# Patient Record
Sex: MALE | Race: NATIVE HAWAIIAN OR OTHER PACIFIC ISLANDER
[De-identification: names, ages, dates, MRNs, and addresses within clinical notes are randomized per-mention and may not be internally consistent; named-entity substitution may affect disease eponyms.]

---

## 2017-02-07 ENCOUNTER — HOSPITAL ENCOUNTER (OUTPATIENT)
Dept: HOSPITAL 67 - LABW | Age: 72
Discharge: HOME | End: 2017-02-07
Attending: INTERNAL MEDICINE
Payer: COMMERCIAL

## 2017-02-07 DIAGNOSIS — E78.4: ICD-10-CM

## 2017-02-07 DIAGNOSIS — Z12.5: ICD-10-CM

## 2017-02-07 DIAGNOSIS — I10: Primary | ICD-10-CM

## 2017-02-07 DIAGNOSIS — R53.81: ICD-10-CM

## 2017-02-07 DIAGNOSIS — I25.10: ICD-10-CM

## 2017-02-07 DIAGNOSIS — F03.90: ICD-10-CM

## 2017-02-07 LAB
LDLC SERPL-MCNC: 139 MG/DL (ref 0–?)
PLATELET # BLD: 231 K/UL (ref 142–355)
POTASSIUM SERPL-SCNC: 4.3 MMOL/L (ref 3.6–5.2)
SODIUM SERPL-SCNC: 134 MMOL/L (ref 136–145)

## 2017-02-07 PROCEDURE — 80053 COMPREHEN METABOLIC PANEL: CPT

## 2017-02-07 PROCEDURE — 85027 COMPLETE CBC AUTOMATED: CPT

## 2017-02-07 PROCEDURE — 86140 C-REACTIVE PROTEIN: CPT

## 2017-02-07 PROCEDURE — 84443 ASSAY THYROID STIM HORMONE: CPT

## 2017-02-07 PROCEDURE — 80061 LIPID PANEL: CPT

## 2017-02-07 PROCEDURE — 36415 COLL VENOUS BLD VENIPUNCTURE: CPT

## 2017-02-07 PROCEDURE — G0103 PSA SCREENING: HCPCS

## 2017-02-07 PROCEDURE — 82607 VITAMIN B-12: CPT

## 2017-09-28 ENCOUNTER — HOSPITAL ENCOUNTER (EMERGENCY)
Dept: HOSPITAL 67 - ED | Age: 72
Discharge: HOME | End: 2017-09-28
Payer: COMMERCIAL

## 2017-09-28 VITALS — TEMPERATURE: 98.1 F | SYSTOLIC BLOOD PRESSURE: 141 MMHG | DIASTOLIC BLOOD PRESSURE: 75 MMHG

## 2017-09-28 VITALS — BODY MASS INDEX: 27.32 KG/M2 | WEIGHT: 170 LBS | HEIGHT: 66 IN

## 2017-09-28 DIAGNOSIS — R07.89: Primary | ICD-10-CM

## 2017-09-28 DIAGNOSIS — S29.011A: ICD-10-CM

## 2017-09-28 LAB
PLATELET # BLD: 145 K/UL (ref 142–355)
POTASSIUM SERPL-SCNC: 3.8 MMOL/L (ref 3.6–5.2)
SODIUM SERPL-SCNC: 136 MMOL/L (ref 136–145)

## 2017-09-28 PROCEDURE — 82550 ASSAY OF CK (CPK): CPT

## 2017-09-28 PROCEDURE — 85027 COMPLETE CBC AUTOMATED: CPT

## 2017-09-28 PROCEDURE — 36415 COLL VENOUS BLD VENIPUNCTURE: CPT

## 2017-09-28 PROCEDURE — 99284 EMERGENCY DEPT VISIT MOD MDM: CPT

## 2017-09-28 PROCEDURE — 80053 COMPREHEN METABOLIC PANEL: CPT

## 2017-09-28 PROCEDURE — 93005 ELECTROCARDIOGRAM TRACING: CPT

## 2017-09-28 PROCEDURE — 84484 ASSAY OF TROPONIN QUANT: CPT

## 2017-11-02 ENCOUNTER — HOSPITAL ENCOUNTER (OUTPATIENT)
Dept: HOSPITAL 67 - CT | Age: 72
Discharge: HOME | End: 2017-11-02
Attending: ORTHOPAEDIC SURGERY
Payer: COMMERCIAL

## 2017-11-02 DIAGNOSIS — M48.061: Primary | ICD-10-CM

## 2018-01-15 ENCOUNTER — HOSPITAL ENCOUNTER (OUTPATIENT)
Dept: HOSPITAL 67 - AMB | Age: 73
Discharge: HOME | End: 2018-01-15
Payer: COMMERCIAL

## 2018-01-15 ENCOUNTER — HOSPITAL ENCOUNTER (EMERGENCY)
Dept: HOSPITAL 67 - ED | Age: 73
Discharge: HOME | End: 2018-01-15
Payer: COMMERCIAL

## 2018-01-15 VITALS — DIASTOLIC BLOOD PRESSURE: 71 MMHG | TEMPERATURE: 97.7 F | SYSTOLIC BLOOD PRESSURE: 133 MMHG

## 2018-01-15 VITALS — BODY MASS INDEX: 26.06 KG/M2 | HEIGHT: 67 IN | WEIGHT: 166 LBS

## 2018-01-15 DIAGNOSIS — S27.1XXA: ICD-10-CM

## 2018-01-15 DIAGNOSIS — M62.838: ICD-10-CM
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
## 2018-01-15 DIAGNOSIS — Z04.1: Primary | ICD-10-CM
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
## 2018-01-15 DIAGNOSIS — S20.211A: Primary | ICD-10-CM

## 2018-01-15 LAB
APTT BLD: 27.6 SECONDS (ref 24.5–33.6)
PLATELET # BLD: 188 K/UL (ref 142–355)
POTASSIUM SERPL-SCNC: 4 MMOL/L (ref 3.6–5.2)
SODIUM SERPL-SCNC: 137 MMOL/L (ref 136–145)

## 2018-01-15 PROCEDURE — 85027 COMPLETE CBC AUTOMATED: CPT

## 2018-01-15 PROCEDURE — 82550 ASSAY OF CK (CPK): CPT

## 2018-01-15 PROCEDURE — 80053 COMPREHEN METABOLIC PANEL: CPT

## 2018-01-15 PROCEDURE — 84484 ASSAY OF TROPONIN QUANT: CPT

## 2018-01-15 PROCEDURE — 85730 THROMBOPLASTIN TIME PARTIAL: CPT

## 2018-01-15 PROCEDURE — 99283 EMERGENCY DEPT VISIT LOW MDM: CPT

## 2018-01-15 PROCEDURE — 85610 PROTHROMBIN TIME: CPT

## 2018-01-15 PROCEDURE — 93005 ELECTROCARDIOGRAM TRACING: CPT

## 2018-01-15 PROCEDURE — 80164 ASSAY DIPROPYLACETIC ACD TOT: CPT

## 2018-01-15 PROCEDURE — 36415 COLL VENOUS BLD VENIPUNCTURE: CPT

## 2018-03-07 ENCOUNTER — HOSPITAL ENCOUNTER (OUTPATIENT)
Dept: HOSPITAL 24 - RAD | Age: 73
End: 2018-03-07
Attending: INTERNAL MEDICINE
Payer: COMMERCIAL

## 2018-03-07 DIAGNOSIS — R10.11: Primary | ICD-10-CM

## 2018-03-07 DIAGNOSIS — N43.2: ICD-10-CM

## 2018-03-07 DIAGNOSIS — N50.812: ICD-10-CM

## 2018-03-07 DIAGNOSIS — N50.811: ICD-10-CM

## 2018-03-07 PROCEDURE — 78227 HEPATOBIL SYST IMAGE W/DRUG: CPT

## 2018-03-07 PROCEDURE — 76870 US EXAM SCROTUM: CPT

## 2018-03-07 NOTE — NM
HISTORY: Right upper quadrant pain, nausea



Study: Nuclear medicine HIDA scan with ejection fraction



Comparison: None



Technique: Multiple scintigraphic images of the abdomen were obtained after the intravenous administr
ation of 5.6 mCi of technetium labeled Choletec.



Following distention of the gallbladder with radiotracer, 8 oz of PO Ensure Plus was administered. An
 estimated gallbladder ejection fraction was then calculated. 



Findings:



Homogeneous uptake of radiotracer is seen throughout the liver.  The intrahepatic biliary ductal syst
em is observed normally.  The common hepatic and common bile duct appear grossly normal with normal b
iliary-bowel transit.  The gallbladder is observed to fill normally.



After the PO administration of 8 oz Ensure Plus, a gallbladder ejection fraction of 16.1% (normal > 3
5%) was observed. 



IMPRESSION:

 

Reduced gallbladder ejection fraction of 16.1%, suggestive for gallbladder dyskinesia.







Reported By:Electronically Signed by JOYCE MATTHEW MD at 3/7/2018 11:40:36 AM

## 2018-03-07 NOTE — US
HISTORY:  Chronic right testicular swelling.  No pain.



Study:  Scrotal ultrasound:  Multiplanar ultrasonographic examination of the scrotum and its contents
 was performed.



Comparison:  None



Findings:  



Overall examination of the testicles demonstrate them to be of normal size, echogenicity and echotext
ure.  Vascular flow was documented and symmetric bilaterally.  I see no evidence of a varicocele.



Right testicle:  4.3 cm in length by 2.7 by 2.6 cm.  The epididymis is normal in its appearance.



Left testicle:  4.2 cm in length by 2.0 by 2.2 cm.  A small intra testicular cyst is noted measuring 
approximately 6 mm in maximum thickness.



There is a large complex hydrocele on the right appearing to have thick septations.  This measures at
 least 6 x 8 3.5 by 6 cm.  A small hydrocele is noted on the left.



IMPRESSION:

1.  Large complex hydrocele on the right having thickened septations.

2.  Intrinsically both testicles appear normal and show symmetric vascular flow.

3. Small hydrocele on the left.







Reported By:Electronically Signed by KENTRELL BURK MD at 3/7/2018 1:15:36 PM

## 2018-03-26 ENCOUNTER — HOSPITAL ENCOUNTER (EMERGENCY)
Dept: HOSPITAL 24 - ER | Age: 73
LOS: 1 days | Discharge: HOME | End: 2018-03-27
Payer: COMMERCIAL

## 2018-03-26 VITALS — BODY MASS INDEX: 26.6 KG/M2

## 2018-03-26 DIAGNOSIS — R51: ICD-10-CM

## 2018-03-26 DIAGNOSIS — R10.84: Primary | ICD-10-CM

## 2018-03-26 LAB
ALBUMIN SERPL BCP-MCNC: 3.6 G/DL (ref 3.4–5)
ALP SERPL-CCNC: 74 UNITS/L (ref 46–116)
ALT SERPL W P-5'-P-CCNC: 57 UNITS/L (ref 12–78)
AMORPH SED URNS QL MICRO: (no result) /HPF
AMYLASE SERPL-CCNC: 63 UNITS/L (ref 25–115)
AST SERPL W P-5'-P-CCNC: 20 UNITS/L (ref 15–37)
BACTERIA URNS QL MICRO: (no result) /HPF
BASOPHILS # BLD AUTO: 0.1 X10^3/UL (ref 0–0.1)
BASOPHILS NFR BLD AUTO: 0.8 % (ref 0.2–1)
BILIRUB UR QL STRIP.AUTO: NEGATIVE
BUN SERPL-MCNC: 26 MG/DL (ref 7–18)
CALCIUM ALBUM COR SERPL-SCNC: (no result) MG/DL (ref 8.5–10.1)
CALCIUM ALBUM COR SERPL-SCNC: 141 MMOL/L (ref 136–145)
CALCIUM SERPL-MCNC: 8.9 MG/DL (ref 8.5–10.1)
CHLORIDE SERPL-SCNC: 105 MMOL/L (ref 98–107)
CK MB SERPL-MCNC: < 1 NG/ML (ref 0–4)
CK SERPL-CCNC: 55 UNITS/L (ref 39–308)
CKMB %: 1.8 % (ref ?–4)
CLARITY UR: (no result)
CO2 SERPL-SCNC: 24.7 MMOL/L (ref 21–32)
COLOR UR AUTO: YELLOW
CREAT SERPL-MCNC: 1.07 MG/DL (ref 0.7–1.3)
EGFR  BLACK RACES: > 60 (ref 60–?)
EOSINOPHIL # BLD AUTO: 0.1 X10^3/UL (ref 0–0.2)
EOSINOPHIL NFR BLD AUTO: 1.1 % (ref 0.9–2.9)
ERYTHROCYTE [DISTWIDTH] IN BLOOD BY AUTOMATED COUNT: 13 % (ref 11.6–16.5)
GLUCOSE UR QL STRIP.AUTO: NEGATIVE
HCT VFR BLD AUTO: 41.8 % (ref 42–54)
HGB BLD-MCNC: 14.9 G/DL (ref 13.5–18)
KETONES UR QL STRIP.AUTO: NEGATIVE
LEUKOCYTE ESTERASE UR QL STRIP.AUTO: (no result)
LIPASE SERPL-CCNC: 190 UNITS/L (ref 73–393)
LYMPHOCYTES # BLD AUTO: 0.9 X10^3/UL (ref 1.3–2.9)
LYMPHOCYTES NFR BLD AUTO: 11.6 % (ref 21–51)
MCH RBC QN AUTO: 31.7 PG (ref 27–34)
MCHC RBC AUTO-ENTMCNC: 35.6 G/DL (ref 33–35)
MCV RBC AUTO: 89.2 FL (ref 80–100)
MONOCYTES # BLD AUTO: 0.6 X10^3/UL (ref 0.3–0.8)
MONOCYTES NFR BLD AUTO: 7.7 % (ref 0–13)
NEUTROPHILS # BLD AUTO: 5.8 X10^3/UL (ref 2.2–4.8)
NEUTROPHILS NFR BLD AUTO: 78.8 % (ref 42–75)
NITRITE UR QL STRIP.AUTO: NEGATIVE
PH UR STRIP.AUTO: 8 [PH] (ref 5–8)
PLATELET # BLD: 180 X10^3/UL (ref 150–450)
PMV BLD AUTO: 8.3 FL (ref 7.4–11)
PROT SERPL-MCNC: 7 G/DL (ref 6.4–8.2)
PROT UR QL STRIP.AUTO: NEGATIVE
RBC # BLD AUTO: 4.69 X10^6/UL (ref 4.7–6)
RBC # UR STRIP.AUTO: NEGATIVE /UL
RBC #/AREA URNS HPF: (no result) /HPF
SODIUM SERPL-SCNC: 141 MMOL/L (ref 136–145)
SP GR UR STRIP.AUTO: 1.01 (ref 1–1.03)
SQUAMOUS URNS QL MICRO: (no result) /HPF
TROPONIN I SERPL-MCNC: < 0.02 NG/ML (ref 0–1.5)
UROBILINOGEN UR QL STRIP.AUTO: NORMAL
WBC NRBC COR # BLD AUTO: 7.4 X10^3/UL (ref 3.6–10)

## 2018-03-26 PROCEDURE — 84484 ASSAY OF TROPONIN QUANT: CPT

## 2018-03-26 PROCEDURE — 85025 COMPLETE CBC W/AUTO DIFF WBC: CPT

## 2018-03-26 PROCEDURE — 93005 ELECTROCARDIOGRAM TRACING: CPT

## 2018-03-26 PROCEDURE — 36415 COLL VENOUS BLD VENIPUNCTURE: CPT

## 2018-03-26 PROCEDURE — 81001 URINALYSIS AUTO W/SCOPE: CPT

## 2018-03-26 PROCEDURE — 82150 ASSAY OF AMYLASE: CPT

## 2018-03-26 PROCEDURE — 70450 CT HEAD/BRAIN W/O DYE: CPT

## 2018-03-26 PROCEDURE — 99283 EMERGENCY DEPT VISIT LOW MDM: CPT

## 2018-03-26 PROCEDURE — 99284 EMERGENCY DEPT VISIT MOD MDM: CPT

## 2018-03-26 PROCEDURE — 82550 ASSAY OF CK (CPK): CPT

## 2018-03-26 PROCEDURE — 82553 CREATINE MB FRACTION: CPT

## 2018-03-26 PROCEDURE — 80053 COMPREHEN METABOLIC PANEL: CPT

## 2018-03-26 PROCEDURE — 83690 ASSAY OF LIPASE: CPT

## 2018-03-26 PROCEDURE — 74176 CT ABD & PELVIS W/O CONTRAST: CPT

## 2018-03-26 NOTE — CT
HISTORY: Headache.



Study:  CT brain without contrast



Comparison: None.



Technique:

Multiple axial images of the brain were obtained from the skull base to the vertex without administra
tion of IV contrast.  Dose reduction techniques including Automated Exposure Control (AEC) and adjust
ment of mA and kV were utilized.





Findings: 



Age-related cortical atrophy and chronic small vessel ischemic changes. No acute intraparenchymal hem
orrhage or mass can be identified.  No extra-axial fluid collections are seen.  No alteration in the 
attenuation of the brain parenchyma can be identified to suggest acute or subacute ischemic change.  
The ventricular system is symmetric and nondilated.  The extracranial structures are grossly unremark
able.



IMPRESSION: No acute intracranial pathology. If clinically concerned for acute ischemia/infarction, M
RI brain is more sensitive.



Reported By:Electronically Signed by TESS HARMAN MD at 3/26/2018 11:13:01 PM

## 2018-03-26 NOTE — DR.GENAD
HPI





- PCP


Primary Care Physician: MIRLANDE





- HPI Comment


HPI Comment: PATIENT HAVE DEMENTIA AND HAVE DIFFICULTY EXPRESSING HIS SYMTOMS. 

WIFE SAID HE DID NOT FEEL GOOD ALL DAY. GETTING WORSE.





- Complaint/Symptoms


Chief Complaint Doctors Comments: PATIENT ABDOMINAL PAIN, HEADACHE. TOOK MOM. 

NOT HELPING.


Chief Complaint:: PT WIFE STATES" HE STARTED COMPLAINING OF NOT FEELING GOOD 

ALL DAY HE TOOK 2 BIG DOSES OF MOM THIS MORNING"





- Nurses notes reviewed


Nurses Notes Review: Yes





- Source


History Provided: Patient





- Mode of Arrival


Mode of Arrival: Wheelchair





- Timing


Onset of Chief Complaint: 03/26/18


Came on: Suddenly





- Duration


Duration: Constant


Duration: Days





- Severity


Severity: Moderate





PMH





- PMH


Past Medical History: Yes


Past Medical History: Alzheimers, Dementia, MI, PUD


Past Surgical History: Yes


Surgical History: Angioplasty/Stents, Tonsillectomy


Past Surgical History Comment: BACK





- Family History


History of Family Medical Conditions: No


Family Medical History: Coronary Artery Disease





- Social History


Have you used tobacco products in the last 12 months: No


Does any household member use tobacco: No


Alcohol Use: None


Lives With: Family


Lives Where: Home





- infectious screening


In the last 2 months have you had wt loss of >10#?: NO


Have you had fever, night sweats or hemotysis?: No


Have you traveled outside the country in the last 6 months?: No


Isolation: Standard





ROS





- Review of Systems


Constitutional: Weakness, Fatigue.  negative: Chills, Fever


Eyes: negative: Eye Pain, Discharge


ENTM: negative: Ear Pain, Nose Discharge, Nose Congestion, Throat Pain


Respiratoy: Non-Productive Cough, Short of Breath (ONEXERTION).  negative: 

Productive Cough, Wheezing, Hemoptysis


Cardiovascular: negative: Chest Pain


Gastrointestinal/Abdominal: Abdominal Pain, Constipation, Nausea.  negative: 

Diarrhea, Vomiting


Genitourinary: negative: Hematuria


Neurological: Headache, Dizziness


Musculoskeletal: Muscle Pain


Integumentary: No Symptoms Reported


Hematologic/Lymphatic: Easy Bleeding, Easy Bruising


Endocrine: No Symptoms Reported


All Other Systems: Reviewed and Negative





PE





- Vital Signs


Vitals: 


 





Temperature                      97.6 F


Pulse Rate [Left]                58


Pulse Rate                       110


Respiratory Rate                 18


Blood Pressure [Right Arm]       119/64


Blood Pressure                   144/67


O2 Sat by Pulse Oximetry         98











- General


Limitations: Altered Mental Status, Other (DEMENTIA)


General Appearance: Alert





- Head


Head Exam: Normal Inspection





- Eyes


Eye exam: Normal Appearance





- ENT


ENT Exam: Normal  External Ear Exam


External Ear Exam: Normal External Inspection


TM/Canal Exam: Bilateral Normal


Nose Exam: Normal Nose Exam


Mouth Exam: Normal Inspection


Throat Exam: Normal Inspection





- Neck


Neck Exam: Trachea Midline





- Chest


Chest Inspection: Symmetric Chest Wall Rise





- Respiratory


Respiratory Exam: Normal Lung Sounds Bilat


Respiratory Exam: Bilateral Rhonchi, Lower Rhonchi





- Cardiovascular


Cardiovascular Exam: Regular Rate, Normal Rhythm, Normal Heart Sounds





- Abdominal Exam


Abdominal Exam: Normal Bowel Sounds, Soft.  negative: Tenderness





- Extremities


Extremities Exam: Normal Inspection





- Back


Back Exam: Normal Inspection





- Neurologic


Neurological Exam: Alert, Other (DEMENTIA)





- Psychiatric


Psychiatric Exam: Anxious





- Skin


Skin Exam: Normal Color





MDM





- Additional Information


Additional Information Obtained From: Family





- Differential Diagnosis


Differential Diagnosis: ABDOMINAL PAIN, BOWEL ONSTRUCTION, DIVERTICULITIS. CVA, 

SINUSITIS





Course





- Treatment


Treatment: SEE ORDERS.





- Education/Counseling


Education/Counseling: Patient, Family, Education


Educated On: Diagnosis, Needs for Follow Up





ROR





- Labs Reviewed


Laboratory Results Reviewed?: Yes


Result Diagrams: 


 03/26/18 22:44





 03/26/18 22:44


Laboratory: 


 











WBC  7.4 X10^3/uL (3.6-10.0)   03/26/18  22:44    


 


RBC  4.69 X10^6/uL (4.7-6.0)  L  03/26/18  22:44    


 


Hgb  14.9 g/dL (13.5-18.0)   03/26/18  22:44    


 


Hct  41.8 % (42.0-54.0)  L  03/26/18  22:44    


 


MCV  89.2 fL (80.0-100.0)   03/26/18  22:44    


 


MCH  31.7 pg (27.0-34.0)   03/26/18  22:44    


 


MCHC  35.6 g/dL (33.0-35.0)  H  03/26/18  22:44    


 


RDW  13.0 % (11.6-16.5)   03/26/18  22:44    


 


Plt Count  180 X10^3/uL (150.0-450.0)   03/26/18  22:44    


 


MPV  8.3 fL (7.4-11.0)   03/26/18  22:44    


 


Neut % (Auto)  78.8 % (42.0-75.0)  H  03/26/18  22:44    


 


Lymph % (Auto)  11.6 % (21.0-51.0)  L  03/26/18  22:44    


 


Mono % (Auto)  7.7 % (0.0-13.0)   03/26/18  22:44    


 


Eos % (Auto)  1.1 % (0.9-2.9)   03/26/18  22:44    


 


Baso % (Auto)  0.8 % (0.2-1.0)   03/26/18  22:44    


 


Neut # (Auto)  5.8 x10^3/uL (2.2-4.8)  H  03/26/18  22:44    


 


Lymph # (Auto)  0.9 X10^3/uL (1.3-2.9)  L  03/26/18  22:44    


 


Mono # (Auto)  0.6 x10^3/uL (0.3-0.8)   03/26/18  22:44    


 


Eos # (Auto)  0.1 x10^3/uL (0.0-0.2)   03/26/18  22:44    


 


Baso # (Auto)  0.1 X10^3/uL (0.0-0.1)   03/26/18  22:44    


 


Absolute Nucleated RBC  0.0 /100WBC  03/26/18  22:44    


 


Sodium  141 mmol/L (136-145)   03/26/18  22:44    


 


Corrected Sodium  141 mmol/L (136-145)   03/26/18  22:44    


 


Potassium  3.6 mmol/L (3.5-5.1)   03/26/18  22:44    


 


Chloride  105 mmol/L ()   03/26/18  22:44    


 


Carbon Dioxide  24.7 mmol/L (21-32)   03/26/18  22:44    


 


BUN  26 mg/dL (7-18)  H  03/26/18  22:44    


 


Creatinine  1.07 mg/dL (0.70-1.30)   03/26/18  22:44    


 


Est GFR (MDRD) Af Amer  > 60  (>60)   03/26/18  22:44    


 


Est GFR (MDRD) Non-Af  > 60  (>60)   03/26/18  22:44    


 


Glucose  111 mg/dL (65-99)  H  03/26/18  22:44    


 


Calcium  8.9 mg/dL (8.5-10.1)   03/26/18  22:44    


 


Corrected Calcium  TNP   03/26/18  22:44    


 


Total Bilirubin  0.30 mg/dL (0.2-1.0)   03/26/18  22:44    


 


AST  20 Units/L (15-37)   03/26/18  22:44    


 


ALT  57 Units/L (12-78)   03/26/18  22:44    


 


Alkaline Phosphatase  74 Units/L ()   03/26/18  22:44    


 


Creatine Kinase  55 Units/L ()   03/26/18  22:44    


 


CK-MB (CK-2)  < 1.0 ng/mL (0-4.0)   03/26/18  22:44    


 


CK/CKMB % Calc  1.8 % (<4)   03/26/18  22:44    


 


Troponin I  < 0.02 ng/mL (0-1.5)   03/26/18  22:44    


 


Total Protein  7.0 g/dL (6.4-8.2)   03/26/18  22:44    


 


Albumin  3.6 g/dL (3.4-5.0)   03/26/18  22:44    


 


Globulin  3.4 g/dL (2.5-4.5)   03/26/18  22:44    


 


Albumin/Globulin Ratio  1.1 Ratio (1.1-2.1)   03/26/18  22:44    


 


Amylase  63 Units/L ()   03/26/18  22:44    


 


Lipase  190 Units/L ()   03/26/18  22:44    


 


Specimen Type  Clean catch urine   03/26/18  23:36    


 


Urine Color  Yellow  (YELLOW)   03/26/18  23:36    


 


Urine Appearance  Slightly hazy  (CLEAR)   03/26/18  23:36    


 


Urine pH  8.0  (5.0 - 8.0)   03/26/18  23:36    


 


Ur Specific Gravity  1.015  (1.000-1.030)   03/26/18  23:36    


 


Urine Protein  Negative  (NEGATIVE)   03/26/18  23:36    


 


Urine Glucose (UA)  Negative  (NEGATIVE)   03/26/18  23:36    


 


Urine Ketones  Negative  (NEGATIVE)   03/26/18  23:36    


 


Urine Occult Blood  Negative  (NEGATIVE)   03/26/18  23:36    


 


Urine Nitrite  Negative  (NEGATIVE)   03/26/18  23:36    


 


Urine Bilirubin  Negative  (NEGATIVE)   03/26/18  23:36    


 


Urine Urobilinogen  Normal  (NORMAL)   03/26/18  23:36    


 


Ur Leukocyte Esterase  1+  (NEGATIVE)   03/26/18  23:36    


 


Urine RBC  0-2 /HPF (NONE SEEN)   03/26/18  23:36    


 


Urine WBC  0-2 /HPF (NONE SEEN)   03/26/18  23:36    


 


Ur Squamous Epith Cells  Rare /HPF (NEGATIVE)   03/26/18  23:36    


 


Amorphous Sediment  1+ /HPF (NEGATIVE)   03/26/18  23:36    


 


Urine Bacteria  Trace /HPF (NEGATIVE)   03/26/18  23:36    


 


Ur Culture Indicated?  No/not indicated   03/26/18  23:36    














- XRAY


XRAY Interpreted by: Radiologist


XRAY Findings: REPORT DISCUSS WITH PATIENT.





- Diagnosis


Discharge Problem: 


Abdominal pain


Qualifiers:


 Abdominal location: generalized Qualified Code(s): R10.84 - Generalized 

abdominal pain





Headache


Qualifiers:


 Headache type: unspecified Headache chronicity pattern: acute headache 

Intractability: not intractable Qualified Code(s): R51 - Headache








- Discharge Plan


Disposition: 01 HOME, SELF-CARE


Condition: Stable





- Follow ups/Referrals


Follow ups/Referrals: 


Homero Gordon [Primary Care Provider] - 03/28/18





- Instructions


Instructions:  Abdominal Pain, Adult, Easy-to-Read, General Headache Without 

Cause, Easy-to-Read


Additional Instructions: 


RETURN TOED IF WORSE.

## 2018-03-26 NOTE — CT
HISTORY: Abdominal pain and nausea.



Study: CT abdomen and pelvis without contrast



Comparison: None.



Technique:



Multiple axial images of the abdomen and pelvis were obtained from the lung bases to the pubic symphy
sis without the administration of IV contrast.  Dose reduction techniques including Automated Exposur
e Control (AEC) and adjustment of mA and kV were utilized.





Findings: Limited study secondary to lack of IV and oral contrast. 



The visualized portions of the lung bases are unremarkable. Extensive calcifications of the visualize
d coronary arteries. Small hiatal hernia.  Partially calcified splenic artery aneurysms, the largest 
measuring 6 mm in greatest dimension. Bilateral simple appearing renal cysts. Vascular calcifications
 versus nonobstructing bilateral renal nephroliths. The liver, spleen, pancreas, and adrenal glands a
re unremarkable in their CT appearance. The gallbladder is unremarkable in its CT appearance.  No sig
nificant mesenteric lymphadenopathy or stranding can be observed.  No free fluid or free air is seen 
within the abdomen.  Limited evaluation of the large and small bowel secondary to collapse and lack o
f oral contrast. Scattered diverticulosis without evidence of diverticulitis. The large and small bow
el otherwise appear normal. The appendix appears normal. Small fat containing right inguinal hernia. 
A benign-appearing lipoma is seen within the right gluteus medius muscle. The urinary bladder is ismael
sly unremarkable.  Degenerative changes of the spine. No aggressive osseous lesions. Vascular calcifi
cations of the abdominal aorta and its major branching vessels without evidence of aneurysmal dilatat
ion.



IMPRESSION:



1.  No CT evidence of acute abdominal/pelvic pathology.



2. Other chronic findings as above.









Reported By:Electronically Signed by TESS HARMAN MD at 3/26/2018 11:20:01 PM

## 2018-03-27 VITALS — DIASTOLIC BLOOD PRESSURE: 64 MMHG | SYSTOLIC BLOOD PRESSURE: 119 MMHG

## 2018-04-16 ENCOUNTER — HOSPITAL ENCOUNTER (OUTPATIENT)
Dept: HOSPITAL 24 - SURG1 | Age: 73
Discharge: HOME | End: 2018-04-16
Attending: SURGERY
Payer: COMMERCIAL

## 2018-04-16 VITALS — SYSTOLIC BLOOD PRESSURE: 141 MMHG | DIASTOLIC BLOOD PRESSURE: 82 MMHG

## 2018-04-16 DIAGNOSIS — K81.1: ICD-10-CM

## 2018-04-16 DIAGNOSIS — K82.8: Primary | ICD-10-CM

## 2018-04-16 LAB
BASOPHILS # BLD AUTO: 0 X10^3/UL (ref 0–0.1)
BASOPHILS NFR BLD AUTO: 0.6 % (ref 0.2–1)
BUN SERPL-MCNC: 23 MG/DL (ref 7–18)
CALCIUM ALBUM COR SERPL-SCNC: 142 MMOL/L (ref 136–145)
CALCIUM SERPL-MCNC: 9.1 MG/DL (ref 8.5–10.1)
CHLORIDE SERPL-SCNC: 105 MMOL/L (ref 98–107)
CO2 SERPL-SCNC: 30.1 MMOL/L (ref 21–32)
CREAT SERPL-MCNC: 1.12 MG/DL (ref 0.7–1.3)
EGFR  BLACK RACES: > 60 (ref 60–?)
EOSINOPHIL # BLD AUTO: 0.1 X10^3/UL (ref 0–0.2)
EOSINOPHIL NFR BLD AUTO: 1.3 % (ref 0.9–2.9)
ERYTHROCYTE [DISTWIDTH] IN BLOOD BY AUTOMATED COUNT: 12.9 % (ref 11.6–16.5)
HCT VFR BLD AUTO: 41.5 % (ref 42–54)
HGB BLD-MCNC: 14.4 G/DL (ref 13.5–18)
LYMPHOCYTES # BLD AUTO: 1.1 X10^3/UL (ref 1.3–2.9)
LYMPHOCYTES NFR BLD AUTO: 18.4 % (ref 21–51)
MCH RBC QN AUTO: 31.4 PG (ref 27–34)
MCHC RBC AUTO-ENTMCNC: 34.6 G/DL (ref 33–35)
MCV RBC AUTO: 90.9 FL (ref 80–100)
MONOCYTES # BLD AUTO: 0.6 X10^3/UL (ref 0.3–0.8)
MONOCYTES NFR BLD AUTO: 10.2 % (ref 0–13)
NEUTROPHILS # BLD AUTO: 4.3 X10^3/UL (ref 2.2–4.8)
NEUTROPHILS NFR BLD AUTO: 69.5 % (ref 42–75)
PLATELET # BLD: 167 X10^3/UL (ref 150–450)
PMV BLD AUTO: 8.6 FL (ref 7.4–11)
RBC # BLD AUTO: 4.57 X10^6/UL (ref 4.7–6)
SODIUM SERPL-SCNC: 142 MMOL/L (ref 136–145)
WBC NRBC COR # BLD AUTO: 6.2 X10^3/UL (ref 3.6–10)

## 2018-04-16 PROCEDURE — 36415 COLL VENOUS BLD VENIPUNCTURE: CPT

## 2018-04-16 PROCEDURE — 85610 PROTHROMBIN TIME: CPT

## 2018-04-16 PROCEDURE — 0FT44ZZ RESECTION OF GALLBLADDER, PERCUTANEOUS ENDOSCOPIC APPROACH: ICD-10-PCS | Performed by: SURGERY

## 2018-04-16 PROCEDURE — 99100 ANES PT EXTEME AGE<1 YR&>70: CPT

## 2018-04-16 PROCEDURE — 80048 BASIC METABOLIC PNL TOTAL CA: CPT

## 2018-04-16 PROCEDURE — 85025 COMPLETE CBC W/AUTO DIFF WBC: CPT

## 2018-04-16 PROCEDURE — S0020 INJECTION, BUPIVICAINE HYDRO: HCPCS

## 2018-04-16 PROCEDURE — 85730 THROMBOPLASTIN TIME PARTIAL: CPT

## 2018-04-16 RX ADMIN — HYDROMORPHONE HYDROCHLORIDE PRN MG: 2 INJECTION, SOLUTION INTRAMUSCULAR; INTRAVENOUS; SUBCUTANEOUS at 10:16

## 2018-04-16 RX ADMIN — HYDROMORPHONE HYDROCHLORIDE PRN MG: 2 INJECTION, SOLUTION INTRAMUSCULAR; INTRAVENOUS; SUBCUTANEOUS at 10:11

## 2018-04-16 NOTE — OR.GENERIC
Post-Op Note Generic





- Post-Op Note


Operative Report: 





Operative Report





Date of Operation:  April 16, 2018





Pre-Operative Diagnosis:  Biliary dyskinesia.





Post-Operative Diagnosis:  1. Mild chronic cholecystitis.


                                               2. Biliary dyskinesia.





Procedure:  Laparoscopic cholecystectomy.





Surgeon:  Wade Greene MD.





Anesthetist:  Julsia Birmingham CRNA.





Specimen:   Gallbladder.





Estimated blood loss:  Minimal.





Complications:  None.








Summary:


The patient is a 73 year old male who presented with biliary dyskinesia.  The 

patient was offered cholecystectomy.  The risk and benefits of the procedure 

including difficulty with anesthesia, bleeding, infection, conversion to open 

procedure, bile leak, hernia formation, DVT, as well as PE were discussed with 

the patient.  The patient understood these risks and requested the procedure.





On April 16, 2018, the patient was brought to the operative theatre.  A time 

out was performed verifying the patient and procedure.  The patient received 

Ancef for pre-operative antibiosis.  After satisfactory induction of general 

endotracheal anesthesia, the abdomen was prepped with Chloraprep and draped in 

the usual sterile fashion.  The skin and subcutaneous tissue inferior to the 

umbilicus was anesthetized using local anesthetic.  The skin was incised 

sharply.  A 12 mm trocar was placed though the incision and into the peritoneal 

cavity using the Optiview technique.  Carbon dioxide was infiltrated through 

this trocar to obtain a pneumoperitoneum of 15 mm Hg.  A camera was placed 

through this trocar and swept in all directions.  No injury was seen from 

entering the peritoneal cavity.    A site was selected in the subxiphoid 

location for our 2nd trocar.  The skin and fascia was anesthetized using local 

anesthetic.  The skin was incised sharply.  A 5 mm trocar was placed into the 

peritoneal cavity under direct visualization.  In a similar manner, two 

additional 5 mm trocars were placed.  The first was placed in the mid-

clavicular line approximately 2 fingerbreadths inferior to the left costal 

margin and a second in the anterior axillary line approximately 2 

fingerbreadths inferior to the left costal margin.    The patient was placed in 

reverse Trendelenburg and rotated to the patients left.  The gallbladder was 

grasped at the fundus and elevated cephalad and slightly lateral.  The 

peritoneum on the medial and lateral aspects of the infundibulum of the 

gallbladder was scored using hook electrocautery.  Using blunt dissection, the 

cystic artery and duct were isolated.  The critical view of safety was 

obtained.  Both of these structures were divided between endoclips.  The 

gallbladder was dissected free using hook electrocautery.  A posterior cystic 

artery was isolated and divided between clips before freeing the gallbladder 

completely.  The gallbladder was placed in an endobag and removed through the 

umbilical trocar site without difficulty.  The trocar and camera were placed 

back inside the abdomen.  Our clips were noted in good position.  Bleeding of 

the gallbladder fossa was controlled using electrocautery.  At this point, the 

5 mm trocars were removed under direct visualization.  No bleeding was seen.  

The umbilical trocar was then removed and pneumoperitoneum released.  The 

fascia at the umbilicus was closed using a 0-Vicryl placed in a figure-of-eight 

configuration.  The skin edges at all incisions were re-approximated using 

inverted, interrupted 4-0 Monocryl sutures.  Mastisol and Steri-strips were 

placed.  Sterile dressings were placed.  The patient was awakened and taken to 

the recovery room in stable condition.  There were no complications.  All 

counts were correct.

## 2020-01-03 ENCOUNTER — HOSPITAL ENCOUNTER (OUTPATIENT)
Dept: HOSPITAL 67 - AMB | Age: 75
Discharge: ANOTHER HOSPITAL | End: 2020-01-03
Payer: COMMERCIAL

## 2020-01-03 ENCOUNTER — HOSPITAL ENCOUNTER (EMERGENCY)
Dept: HOSPITAL 67 - ED | Age: 75
Discharge: ANOTHER HOSPITAL | End: 2020-01-03
Payer: COMMERCIAL

## 2020-01-03 VITALS — TEMPERATURE: 99 F

## 2020-01-03 VITALS — BODY MASS INDEX: 25.92 KG/M2 | HEIGHT: 69 IN | WEIGHT: 175 LBS

## 2020-01-03 VITALS — SYSTOLIC BLOOD PRESSURE: 126 MMHG | DIASTOLIC BLOOD PRESSURE: 74 MMHG

## 2020-01-03 DIAGNOSIS — R53.1: ICD-10-CM

## 2020-01-03 DIAGNOSIS — R41.82: Primary | ICD-10-CM

## 2020-01-03 DIAGNOSIS — F02.81: ICD-10-CM

## 2020-01-03 DIAGNOSIS — R41.82: ICD-10-CM

## 2020-01-03 DIAGNOSIS — G45.9: Primary | ICD-10-CM

## 2020-01-03 DIAGNOSIS — G30.9: ICD-10-CM

## 2020-01-03 DIAGNOSIS — R94.31: ICD-10-CM

## 2020-01-03 LAB
APTT BLD: 26.4 SECONDS (ref 24.5–33.6)
PLATELET # BLD: 162 K/UL (ref 142–355)
POTASSIUM SERPL-SCNC: 4.6 MMOL/L (ref 3.6–5.2)
SODIUM SERPL-SCNC: 138 MMOL/L (ref 136–145)

## 2020-01-03 PROCEDURE — 80053 COMPREHEN METABOLIC PANEL: CPT

## 2020-01-03 PROCEDURE — 93005 ELECTROCARDIOGRAM TRACING: CPT

## 2020-01-03 PROCEDURE — 87651 STREP A DNA AMP PROBE: CPT

## 2020-01-03 PROCEDURE — 84484 ASSAY OF TROPONIN QUANT: CPT

## 2020-01-03 PROCEDURE — 85730 THROMBOPLASTIN TIME PARTIAL: CPT

## 2020-01-03 PROCEDURE — 51702 INSERT TEMP BLADDER CATH: CPT

## 2020-01-03 PROCEDURE — 87502 INFLUENZA DNA AMP PROBE: CPT

## 2020-01-03 PROCEDURE — 85610 PROTHROMBIN TIME: CPT

## 2020-01-03 PROCEDURE — 85027 COMPLETE CBC AUTOMATED: CPT

## 2020-01-03 PROCEDURE — 0T9B70Z DRAINAGE OF BLADDER WITH DRAINAGE DEVICE, VIA NATURAL OR ARTIFICIAL OPENING: ICD-10-PCS | Performed by: FAMILY MEDICINE

## 2020-01-03 PROCEDURE — A0425 GROUND MILEAGE: HCPCS

## 2020-01-03 PROCEDURE — A0427 ALS1-EMERGENCY: HCPCS

## 2020-01-03 PROCEDURE — 81000 URINALYSIS NONAUTO W/SCOPE: CPT

## 2020-01-03 PROCEDURE — 99283 EMERGENCY DEPT VISIT LOW MDM: CPT

## 2020-01-10 ENCOUNTER — HOSPITAL ENCOUNTER (OUTPATIENT)
Dept: HOSPITAL 67 - AMB | Age: 75
Discharge: ANOTHER HOSPITAL | End: 2020-01-10
Payer: COMMERCIAL

## 2020-01-10 DIAGNOSIS — R53.1: ICD-10-CM

## 2020-01-10 DIAGNOSIS — R41.82: Primary | ICD-10-CM

## 2020-01-10 PROCEDURE — A0425 GROUND MILEAGE: HCPCS

## 2020-01-10 PROCEDURE — A0427 ALS1-EMERGENCY: HCPCS

## 2020-03-12 ENCOUNTER — HOSPITAL ENCOUNTER (OUTPATIENT)
Dept: HOSPITAL 67 - LABW | Age: 75
Discharge: HOME | End: 2020-03-12
Attending: INTERNAL MEDICINE
Payer: COMMERCIAL

## 2020-03-12 DIAGNOSIS — E53.8: ICD-10-CM

## 2020-03-12 DIAGNOSIS — M62.81: ICD-10-CM

## 2020-03-12 DIAGNOSIS — R41.81: ICD-10-CM

## 2020-03-12 DIAGNOSIS — Z51.81: ICD-10-CM

## 2020-03-12 DIAGNOSIS — R41.844: ICD-10-CM

## 2020-03-12 DIAGNOSIS — E78.2: Primary | ICD-10-CM

## 2020-03-12 DIAGNOSIS — E55.9: ICD-10-CM

## 2020-03-12 LAB
PLATELET # BLD: 200 K/UL (ref 142–355)
POTASSIUM SERPL-SCNC: 4 MMOL/L (ref 3.6–5.2)
SODIUM SERPL-SCNC: 139 MMOL/L (ref 136–145)

## 2020-03-12 PROCEDURE — 82607 VITAMIN B-12: CPT

## 2020-03-12 PROCEDURE — 82747 ASSAY OF FOLIC ACID RBC: CPT

## 2020-03-12 PROCEDURE — 80076 HEPATIC FUNCTION PANEL: CPT

## 2020-03-12 PROCEDURE — 82550 ASSAY OF CK (CPK): CPT

## 2020-03-12 PROCEDURE — 80164 ASSAY DIPROPYLACETIC ACD TOT: CPT

## 2020-03-12 PROCEDURE — 85027 COMPLETE CBC AUTOMATED: CPT

## 2020-03-12 PROCEDURE — 80048 BASIC METABOLIC PNL TOTAL CA: CPT

## 2020-03-12 PROCEDURE — 36415 COLL VENOUS BLD VENIPUNCTURE: CPT

## 2020-03-12 PROCEDURE — 84443 ASSAY THYROID STIM HORMONE: CPT

## 2020-03-12 PROCEDURE — 81000 URINALYSIS NONAUTO W/SCOPE: CPT

## 2020-03-12 PROCEDURE — 82306 VITAMIN D 25 HYDROXY: CPT

## 2023-03-30 ENCOUNTER — HOSPITAL ENCOUNTER (INPATIENT)
Dept: HOSPITAL 67 - PAVC | Age: 78
LOS: 2 days | Discharge: STILL ADMITTED OR ADMINISTRATIVE | End: 2023-04-01
Attending: INTERNAL MEDICINE | Admitting: INTERNAL MEDICINE
Payer: COMMERCIAL

## 2023-03-31 ENCOUNTER — HOSPITAL ENCOUNTER (OUTPATIENT)
Dept: HOSPITAL 67 - LAB | Age: 78
Discharge: HOME | End: 2023-03-31
Attending: INTERNAL MEDICINE
Payer: COMMERCIAL

## 2023-03-31 DIAGNOSIS — G30.9: Primary | ICD-10-CM

## 2023-03-31 PROCEDURE — 87081 CULTURE SCREEN ONLY: CPT

## 2023-04-01 ENCOUNTER — HOSPITAL ENCOUNTER (INPATIENT)
Dept: HOSPITAL 67 - PAVC | Age: 78
LOS: 30 days | Discharge: STILL ADMITTED OR ADMINISTRATIVE | End: 2023-05-01
Attending: INTERNAL MEDICINE | Admitting: INTERNAL MEDICINE
Payer: COMMERCIAL

## 2023-04-03 ENCOUNTER — HOSPITAL ENCOUNTER (OUTPATIENT)
Dept: HOSPITAL 67 - LAB | Age: 78
Discharge: HOME | End: 2023-04-03
Attending: INTERNAL MEDICINE
Payer: COMMERCIAL

## 2023-04-03 DIAGNOSIS — E55.9: ICD-10-CM

## 2023-04-03 DIAGNOSIS — G30.9: Primary | ICD-10-CM

## 2023-04-03 DIAGNOSIS — E78.49: ICD-10-CM

## 2023-04-03 LAB
LDLC SERPL-MCNC: 77 MG/DL (ref 0–?)
PLATELET # BLD: 179 K/UL (ref 142–355)
POTASSIUM SERPL-SCNC: 4 MMOL/L (ref 3.6–5.2)
SODIUM SERPL-SCNC: 142 MMOL/L (ref 136–145)

## 2023-04-03 PROCEDURE — 80061 LIPID PANEL: CPT

## 2023-04-03 PROCEDURE — 80053 COMPREHEN METABOLIC PANEL: CPT

## 2023-04-03 PROCEDURE — 85027 COMPLETE CBC AUTOMATED: CPT

## 2023-04-03 PROCEDURE — 36415 COLL VENOUS BLD VENIPUNCTURE: CPT

## 2023-04-06 ENCOUNTER — HOSPITAL ENCOUNTER (OUTPATIENT)
Dept: HOSPITAL 67 - LAB | Age: 78
Discharge: HOME | End: 2023-04-06
Attending: INTERNAL MEDICINE
Payer: COMMERCIAL

## 2023-04-06 DIAGNOSIS — I25.10: Primary | ICD-10-CM

## 2023-04-06 DIAGNOSIS — E78.49: ICD-10-CM

## 2023-04-06 LAB
PLATELET # BLD: 177 K/UL (ref 142–355)
POTASSIUM SERPL-SCNC: 4.2 MMOL/L (ref 3.6–5.2)
SODIUM SERPL-SCNC: 144 MMOL/L (ref 136–145)

## 2023-04-06 PROCEDURE — 80053 COMPREHEN METABOLIC PANEL: CPT

## 2023-04-06 PROCEDURE — 82542 COL CHROMOTOGRAPHY QUAL/QUAN: CPT

## 2023-04-06 PROCEDURE — 80164 ASSAY DIPROPYLACETIC ACD TOT: CPT

## 2023-04-06 PROCEDURE — 85027 COMPLETE CBC AUTOMATED: CPT

## 2023-04-06 PROCEDURE — 81000 URINALYSIS NONAUTO W/SCOPE: CPT

## 2023-05-01 ENCOUNTER — HOSPITAL ENCOUNTER (INPATIENT)
Dept: HOSPITAL 67 - PAVC | Age: 78
LOS: 31 days | Discharge: STILL ADMITTED OR ADMINISTRATIVE | End: 2023-06-01
Attending: INTERNAL MEDICINE | Admitting: INTERNAL MEDICINE
Payer: COMMERCIAL

## 2023-06-01 ENCOUNTER — HOSPITAL ENCOUNTER (INPATIENT)
Dept: HOSPITAL 67 - PAVC | Age: 78
LOS: 30 days | Discharge: STILL ADMITTED OR ADMINISTRATIVE | End: 2023-07-01
Attending: INTERNAL MEDICINE | Admitting: INTERNAL MEDICINE
Payer: COMMERCIAL

## 2024-04-26 ENCOUNTER — LAB (OUTPATIENT)
Dept: URBAN - METROPOLITAN AREA MEDICAL CENTER 19 | Facility: MEDICAL CENTER | Age: 79
End: 2024-04-26
Payer: MEDICARE

## 2024-04-26 DIAGNOSIS — D72.828 OTHER ELEVATED WHITE BLOOD CELL COUNT: ICD-10-CM

## 2024-04-26 DIAGNOSIS — J69.0 ACUTE ASPIRATION PNEUMONIA: ICD-10-CM

## 2024-04-26 DIAGNOSIS — R11.2 NAUSEA AND VOMITING, UNSPECIFIED VOMITING TYPE: ICD-10-CM

## 2024-04-26 DIAGNOSIS — K29.60 OTHER GASTRITIS WITHOUT BLEEDING: ICD-10-CM

## 2024-04-26 DIAGNOSIS — R93.5 ABNORMAL FINDINGS ON DIAGNOSTIC IMAGING OF OTHER ABDOMINAL REGIONS, INCLUDING RETROPERITONEUM: ICD-10-CM

## 2024-04-26 PROCEDURE — 99223 1ST HOSP IP/OBS HIGH 75: CPT | Performed by: INTERNAL MEDICINE

## 2024-04-27 ENCOUNTER — LAB (OUTPATIENT)
Dept: URBAN - METROPOLITAN AREA MEDICAL CENTER 19 | Facility: MEDICAL CENTER | Age: 79
End: 2024-04-27
Payer: MEDICARE

## 2024-04-27 DIAGNOSIS — R93.5 ABNORMAL FINDINGS ON DIAGNOSTIC IMAGING OF OTHER ABDOMINAL REGIONS, INCLUDING RETROPERITONEUM: ICD-10-CM

## 2024-04-27 DIAGNOSIS — J69.0 ACUTE ASPIRATION PNEUMONIA: ICD-10-CM

## 2024-04-27 DIAGNOSIS — R11.2 NAUSEA AND VOMITING, UNSPECIFIED VOMITING TYPE: ICD-10-CM

## 2024-04-27 DIAGNOSIS — K29.60 OTHER GASTRITIS WITHOUT BLEEDING: ICD-10-CM

## 2024-04-27 DIAGNOSIS — D72.828 OTHER ELEVATED WHITE BLOOD CELL COUNT: ICD-10-CM

## 2024-04-27 PROCEDURE — 99232 SBSQ HOSP IP/OBS MODERATE 35: CPT | Performed by: INTERNAL MEDICINE

## 2024-04-28 ENCOUNTER — LAB (OUTPATIENT)
Dept: URBAN - METROPOLITAN AREA MEDICAL CENTER 19 | Facility: MEDICAL CENTER | Age: 79
End: 2024-04-28
Payer: MEDICARE

## 2024-04-28 DIAGNOSIS — K29.60 EMPHYSEMATOUS GASTRITIS: ICD-10-CM

## 2024-04-28 DIAGNOSIS — R11.2 NAUSEA AND VOMITING, UNSPECIFIED VOMITING TYPE: ICD-10-CM

## 2024-04-28 DIAGNOSIS — J69.0 ACUTE ASPIRATION PNEUMONIA: ICD-10-CM

## 2024-04-28 DIAGNOSIS — D72.828 OTHER ELEVATED WHITE BLOOD CELL COUNT: ICD-10-CM

## 2024-04-28 PROCEDURE — 99232 SBSQ HOSP IP/OBS MODERATE 35: CPT | Performed by: INTERNAL MEDICINE

## 2024-04-29 ENCOUNTER — LAB (OUTPATIENT)
Dept: URBAN - METROPOLITAN AREA MEDICAL CENTER 19 | Facility: MEDICAL CENTER | Age: 79
End: 2024-04-29
Payer: MEDICARE

## 2024-04-29 DIAGNOSIS — J69.0 ACUTE ASPIRATION PNEUMONIA: ICD-10-CM

## 2024-04-29 DIAGNOSIS — K29.60 OTHER GASTRITIS WITHOUT BLEEDING: ICD-10-CM

## 2024-04-29 DIAGNOSIS — K59.09 OTHER CONSTIPATION: ICD-10-CM

## 2024-04-29 DIAGNOSIS — R11.2 NAUSEA AND VOMITING, UNSPECIFIED VOMITING TYPE: ICD-10-CM

## 2024-04-29 PROCEDURE — 99232 SBSQ HOSP IP/OBS MODERATE 35: CPT | Performed by: PHYSICIAN ASSISTANT
